# Patient Record
Sex: MALE | Race: OTHER | Employment: STUDENT | URBAN - METROPOLITAN AREA
[De-identification: names, ages, dates, MRNs, and addresses within clinical notes are randomized per-mention and may not be internally consistent; named-entity substitution may affect disease eponyms.]

---
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
## 2025-01-04 ENCOUNTER — OFFICE VISIT (OUTPATIENT)
Dept: FAMILY MEDICINE CLINIC | Facility: CLINIC | Age: 20
End: 2025-01-04
Payer: COMMERCIAL

## 2025-01-04 VITALS
WEIGHT: 147 LBS | TEMPERATURE: 98 F | HEART RATE: 95 BPM | OXYGEN SATURATION: 99 % | SYSTOLIC BLOOD PRESSURE: 118 MMHG | HEIGHT: 66 IN | RESPIRATION RATE: 16 BRPM | DIASTOLIC BLOOD PRESSURE: 88 MMHG | BODY MASS INDEX: 23.63 KG/M2

## 2025-01-04 DIAGNOSIS — H65.192 OTHER NON-RECURRENT ACUTE NONSUPPURATIVE OTITIS MEDIA OF LEFT EAR: Primary | ICD-10-CM

## 2025-01-04 RX ORDER — AMOXICILLIN 875 MG/1
875 TABLET, COATED ORAL 2 TIMES DAILY
Qty: 20 TABLET | Refills: 0 | Status: SHIPPED | OUTPATIENT
Start: 2025-01-04 | End: 2025-01-14

## 2025-01-04 NOTE — PROGRESS NOTES
CHIEF COMPLAINT:     Chief Complaint   Patient presents with    Ear Problem     X this morning L ear feeling clogged, slight pain  Denies fever        HPI:   Max Martinez is a 19 year old male who presents to clinic today with complaints of left ear pain. Has had for 1  days. Pain is described as clogged/pain.  Patient denies history of ear infections. Home treatment includes none.     Associated symptoms:  Patient denies decreased hearing. Patient denies hearing loss. Patient denies drainage. Patient denies use of cotton tipped ear swabs to clean the ears. Patient reports following URI symptoms:  cold symptoms last week.    Current Outpatient Medications   Medication Sig Dispense Refill    amoxicillin 875 MG Oral Tab Take 1 tablet (875 mg total) by mouth 2 (two) times daily for 10 days. 20 tablet 0      No past medical history on file.   Social History:  Social History     Socioeconomic History    Marital status: Single   Tobacco Use    Smoking status: Never    Smokeless tobacco: Never   Vaping Use    Vaping status: Never Used   Substance and Sexual Activity    Alcohol use: Never    Drug use: Never        REVIEW OF SYSTEMS:   GENERAL: See HPI  SKIN: no unusual skin lesions or rashes  HEENT: See HPI  LUNGS: No shortness of breath, or wheezing.  CARDIOVASCULAR: No chest pain, palpitations  GI: No N/V/C/D.  NEURO: denies headaches or dizziness    EXAM:   /88   Pulse 95   Temp 98.2 °F (36.8 °C) (Oral)   Resp 16   Ht 5' 6\" (1.676 m)   Wt 147 lb (66.7 kg)   SpO2 99%   BMI 23.73 kg/m²   GENERAL: well developed, well nourished,in no apparent distress  SKIN: no rashes,no suspicious lesions  HEAD: atraumatic, normocephalic  EYES: conjunctiva clear, EOM intact  EARS: bilateral tragus not tender with manipulation.  External auditory canals clear. Right TM: without erythema, no bulging, no retraction,no effusion, bony landmarks visible.  Left TM: erythematous, no bulging, no retraction,no effusion, bony  landmarks visible.  NOSE: nostrils patent, no nasal mucus, nasal mucosa not inflamed  THROAT: oral mucosa pink, moist. Posterior pharynx is not erythematous or injected. No exudates.  NECK: supple, non-tender  LUNGS: clear to auscultation bilaterally, no wheezes or rhonchi. Breathing is non labored.  CARDIO: RRR without murmur  EXTREMITIES: no cyanosis, clubbing or edema  LYMPH: no cervical lymphadenopathy.      ASSESSMENT AND PLAN:   Max Martinez is a 19 year old male who presents with ear problems symptoms are consistent with    ASSESSMENT:  Encounter Diagnosis   Name Primary?    Other non-recurrent acute nonsuppurative otitis media of left ear Yes       PLAN: Meds as listed below.  Comfort measures as described in Patient Instructions    Meds & Refills for this Visit:  Requested Prescriptions     Signed Prescriptions Disp Refills    amoxicillin 875 MG Oral Tab 20 tablet 0     Sig: Take 1 tablet (875 mg total) by mouth 2 (two) times daily for 10 days.         Risk and benefits of medication discussed.   If antibiotics prescribed, stressed importance of completing full course of antibiotic.     Acetaminophen or NSAID prn pain.      Follow up with PCP if s/sx worsen, do not begin to improve in 3 days, or if fever of 100.4 or greater persists for 72 hours.      Patient voiced understand and is in agreement with treatment plan.    There are no Patient Instructions on file for this visit.

## (undated) NOTE — LETTER
Date: 10/26/2022    Patient Name: Mireille Mack          To Whom it may concern: This letter has been written at the patient's request. The above patient was seen at the [facility] for evaluation of a medical condition. This patient should be excused from attending school through 10/26/22. The patient may return to school on or around 10/27/22  If symptoms are resolved.         Sincerely,    EFE Bush

## (undated) NOTE — LETTER
Date: 1/26/2018    Patient Name: Randall Madison          To Whom it may concern: This letter has been written at the patient's request. The above patient was seen at the [facility] for treatment of a medical condition.     This patient sh

## (undated) NOTE — LETTER
Date: 12/4/2018    Patient Name: Kandy Wilson          To Whom it may concern: The above patient was seen at the [facility] for treatment of a medical condition.     This patient should be excused from attending school until fever free

## (undated) NOTE — LETTER
Date: 10/4/2022    Patient Name: Martín Fragoso    To Whom it may concern: The above patient was seen at the [facility] for evaluation of a medical condition. This patient may return to school if Covid test is negative and patient is fever free for 24 hours without medication use. Patient symptoms must be mostly resolved/improved. Parent may report when this occurs as Ian Martin does not need to be seen again unless symptoms significantly worsen. If covid test is positive patient can return no sooner than 10/10/22. The patient is expected to return with no limitations.             Sincerely,        EFE Lombardo

## (undated) NOTE — LETTER
Date: 1/14/2019    Patient Name: Randall Madison          To Whom it may concern: The above patient was seen at the [facility] for treatment of a medical condition.     The patient's mother David Rahman should be excused from attendin

## (undated) NOTE — LETTER
Date: 9/27/2021    Patient Name: Lori Pastor          To Whom it may concern: This letter has been written at the patient's request. The above patient was seen at the [facility] for treatment of a medical condition.     This patient sh

## (undated) NOTE — Clinical Note
Date: 1/24/2017    Patient Name: Bre Freeman          To Whom it may concern: The above patient was seen at the [facility] for treatment of a medical condition.     This patient should be excused from attending school 1/23/17-1/25/17,

## (undated) NOTE — MR AVS SNAPSHOT
[address] [facility] [address] [phone]               Thank you for choosing us for your health care visit with DELILAH Weiss.   We are glad to serve you and happy to provide you with this summary of y · Unsweetened canned fruit (but not pineapple) and bananas  Don't eat more than 15 grams of fat a day. Do this by staying away from margarine, butter, oils, mayonnaise, sauces, gravies, fried foods, peanut butter, meat, poultry, and fish.   Don't eat much f Children can get the viruses from food, eating utensils, or toys. People who have had the infection can be contagious even after they feel better. And some people are infected but never have symptoms.  Because of this, outbreaks of gastroenteritis are commo · Ask your child’s health care provider whether your child should receive the rotavirus vaccine. This vaccine protects infants and young children against rotavirus infection, one cause of viral gastroenteritis.   Get Medical Help Right Away If the Child:  · Proxy Access to your child’s MyChart go to [email or web address] and click on the   Sign Up Forms link in the Additional Information box on the right. MyChart Questions? Call [phone] for help.   MyChart is NOT to be used for urgent needs o Limiting fast food, take out food, and eating out at restaurants  o Preparing foods at home as a family  o Eating a diet rich in calcium  o Eating a high fiber diet    Help your children form healthy habits.   Healthy active children are more likely to be

## (undated) NOTE — LETTER
Date: 2/7/2020    Patient Name: Lindsay Berry          To Whom it may concern: The above patient was seen at the [facility] for treatment of a medical condition.     This patient should be excused from attending work/school on  2/4/20, 2

## (undated) NOTE — LETTER
Date: 11/7/2018    Patient Name: Yanet Drew          To Whom it may concern: The above patient was seen at the [facility] for treatment of a medical condition. This patient should be excused from attending school on 11/7.     The p

## (undated) NOTE — LETTER
Date: 8/29/2018    Patient Name: Bre Freeman          To Whom it may concern: This letter has been written at the patient's request. The above patient was seen at the [facility] for treatment of a medical condition.     This patient sh

## (undated) NOTE — LETTER
Date: 1/27/2023    Patient Name: Mireille Mack          To Whom it may concern: This letter has been written at the patient's request. The above patient was seen at the [facility] for treatment of a medical condition. This patient should be excused from attending school.         Sincerely,        EFE pApiah

## (undated) NOTE — MR AVS SNAPSHOT
[address]  [address] [facility] [address] [phone]               Thank you for choosing us for your health care visit with PAIGE Warren.   We are glad to serve you and happy to provide you with this summary of yo Use medicine for more relief  Over-the-counter medicine can reduce sore throat symptoms. Ask your pharmacist if you have questions about which medicine to use:  · Ease pain with anesthetic sprays. Aspirin or an aspirin substitute also helps.  Remember, lola BP percentiles are based on 2000 NHANES data         Current Medications          This list is accurate as of: 5/6/17  8:59 AM.  Always use your most recent med list.                acetaminophen 325 MG Tabs   Take 325 mg by mouth every 6 (six) hours as

## (undated) NOTE — LETTER
Date: 2/21/2019    Patient Name: Nino Martinez    To Whom it may concern: The above patient was seen at the [facility] for treatment of a medical condition.     This patient should be excused from attending school until fever free for 24

## (undated) NOTE — LETTER
Date: 4/26/2022    Patient Name: Summer Love          To Whom it may concern: This letter has been written at the patient's request. The above patient was seen at the [facility] for treatment of a medical condition. This patient tested positive for COVID-19. Per CDC guidelines the patient should remain home for 5 days, and then should wear a mask at all times for another 5 days. Please also follow your school policies for return to school.          Sincerely,    EFE Vergara